# Patient Record
Sex: MALE | Race: WHITE | NOT HISPANIC OR LATINO | Employment: FULL TIME | URBAN - METROPOLITAN AREA
[De-identification: names, ages, dates, MRNs, and addresses within clinical notes are randomized per-mention and may not be internally consistent; named-entity substitution may affect disease eponyms.]

---

## 2017-03-21 ENCOUNTER — GENERIC CONVERSION - ENCOUNTER (OUTPATIENT)
Dept: OTHER | Facility: OTHER | Age: 27
End: 2017-03-21

## 2017-03-21 ENCOUNTER — ALLSCRIPTS OFFICE VISIT (OUTPATIENT)
Dept: OTHER | Facility: OTHER | Age: 27
End: 2017-03-21

## 2017-03-22 ENCOUNTER — GENERIC CONVERSION - ENCOUNTER (OUTPATIENT)
Dept: OTHER | Facility: OTHER | Age: 27
End: 2017-03-22

## 2017-03-22 LAB
A/G RATIO (HISTORICAL): 1.8 (ref 1.2–2.2)
ALBUMIN SERPL BCP-MCNC: 4.8 G/DL (ref 3.5–5.5)
ALP SERPL-CCNC: 91 IU/L (ref 39–117)
ALT SERPL W P-5'-P-CCNC: 71 IU/L (ref 0–44)
AST SERPL W P-5'-P-CCNC: 169 IU/L (ref 0–40)
BASOPHILS # BLD AUTO: 0.1 X10E3/UL (ref 0–0.2)
BASOPHILS # BLD AUTO: 1 %
BILIRUB SERPL-MCNC: 0.6 MG/DL (ref 0–1.2)
BUN SERPL-MCNC: 14 MG/DL (ref 6–20)
BUN/CREA RATIO (HISTORICAL): 12 (ref 8–19)
CALCIUM SERPL-MCNC: 9.8 MG/DL (ref 8.7–10.2)
CHLORIDE SERPL-SCNC: 98 MMOL/L (ref 96–106)
CHOLEST SERPL-MCNC: 213 MG/DL (ref 100–199)
CO2 SERPL-SCNC: 25 MMOL/L (ref 18–29)
CREAT SERPL-MCNC: 1.2 MG/DL (ref 0.76–1.27)
DEPRECATED RDW RBC AUTO: 13.9 % (ref 12.3–15.4)
EGFR AFRICAN AMERICAN (HISTORICAL): 96 ML/MIN/1.73
EGFR-AMERICAN CALC (HISTORICAL): 83 ML/MIN/1.73
EOSINOPHIL # BLD AUTO: 0.2 X10E3/UL (ref 0–0.4)
EOSINOPHIL # BLD AUTO: 3 %
GLUCOSE SERPL-MCNC: 100 MG/DL (ref 65–99)
HCT VFR BLD AUTO: 44.6 % (ref 37.5–51)
HDLC SERPL-MCNC: 41 MG/DL
HGB BLD-MCNC: 15.1 G/DL (ref 12.6–17.7)
IMM.GRANULOCYTES (CD4/8) (HISTORICAL): 0 %
IMM.GRANULOCYTES (CD4/8) (HISTORICAL): 0 X10E3/UL (ref 0–0.1)
LDLC SERPL CALC-MCNC: 143 MG/DL (ref 0–99)
LYMPHOCYTES # BLD AUTO: 2.1 X10E3/UL (ref 0.7–3.1)
LYMPHOCYTES # BLD AUTO: 29 %
MCH RBC QN AUTO: 27.5 PG (ref 26.6–33)
MCHC RBC AUTO-ENTMCNC: 33.9 G/DL (ref 31.5–35.7)
MCV RBC AUTO: 81 FL (ref 79–97)
MONOCYTES # BLD AUTO: 0.5 X10E3/UL (ref 0.1–0.9)
MONOCYTES (HISTORICAL): 7 %
NEUTROPHILS # BLD AUTO: 4.3 X10E3/UL (ref 1.4–7)
NEUTROPHILS # BLD AUTO: 60 %
PLATELET # BLD AUTO: 310 X10E3/UL (ref 150–379)
POTASSIUM SERPL-SCNC: 4.6 MMOL/L (ref 3.5–5.2)
RBC (HISTORICAL): 5.5 X10E6/UL (ref 4.14–5.8)
SODIUM SERPL-SCNC: 141 MMOL/L (ref 134–144)
TOT. GLOBULIN, SERUM (HISTORICAL): 2.6 G/DL (ref 1.5–4.5)
TOTAL PROTEIN (HISTORICAL): 7.4 G/DL (ref 6–8.5)
TRIGL SERPL-MCNC: 147 MG/DL (ref 0–149)
WBC # BLD AUTO: 7.1 X10E3/UL (ref 3.4–10.8)

## 2017-03-23 ENCOUNTER — GENERIC CONVERSION - ENCOUNTER (OUTPATIENT)
Dept: OTHER | Facility: OTHER | Age: 27
End: 2017-03-23

## 2017-03-23 LAB
HEPATITIS A IGM ANTIBODY (HISTORICAL): NEGATIVE
HEPATITIS B CORE IGM ANTIBODY (HISTORICAL): NEGATIVE
HEPATITIS B SURFACE ANTIGEN (HISTORICAL): NEGATIVE
HEPATITIS C ANTIBODY (HISTORICAL): <0.1 S/CO RATIO (ref 0–0.9)
WRITTEN AUTHORIZATION (HISTORICAL): NORMAL

## 2017-11-10 ENCOUNTER — ALLSCRIPTS OFFICE VISIT (OUTPATIENT)
Dept: OTHER | Facility: OTHER | Age: 27
End: 2017-11-10

## 2017-11-11 LAB
ALBUMIN SERPL BCP-MCNC: 4.8 G/DL (ref 3.5–5.5)
ALP SERPL-CCNC: 85 IU/L (ref 39–117)
ALT SERPL W P-5'-P-CCNC: 39 IU/L (ref 0–44)
AST SERPL W P-5'-P-CCNC: 30 IU/L (ref 0–40)
BILIRUB SERPL-MCNC: 0.5 MG/DL (ref 0–1.2)
BILIRUBIN DIRECT (HISTORICAL): 0.12 MG/DL (ref 0–0.4)
CHOLEST SERPL-MCNC: 207 MG/DL (ref 100–199)
HBA1C MFR BLD HPLC: 5.4 % (ref 4.8–5.6)
HDLC SERPL-MCNC: 36 MG/DL
LDLC SERPL CALC-MCNC: 143 MG/DL (ref 0–99)
TOTAL PROTEIN (HISTORICAL): 7.4 G/DL (ref 6–8.5)
TRIGL SERPL-MCNC: 142 MG/DL (ref 0–149)

## 2017-11-13 NOTE — PROGRESS NOTES
Assessment    1  Dyspnea on exertion (786 09) (R06 09)   2  Hyperlipidemia, unspecified (272 4) (E78 5)   3  Elevated fasting blood sugar (790 21) (R73 01)   4  Elevated liver function tests (790 6) (R79 89)   5  Never a smoker   6  Family history of valvular heart disease (V17 49) (Z82 49) : Family History    Plan  Dyspnea on exertion    · SPIROMETRY W/O BRONCHO- POC; Status:Active - Perform Order; Requestedfor:2017;   Dyspnea on exertion, FamHx: Family history of valvular heart disease, Hyperlipidemia,unspecified    · ECHO COMPLETE WITH CONTRAST IF INDICATED; Status:Need Information -Financial Authorization; Requested for:2017;   Elevated fasting blood sugar    · (1) HEMOGLOBIN A1C; Status:Resulted - Requires Verification;   Done: 60AZF037348:02IU  Elevated liver function tests    · (1) HEPATIC FUNCTION PANEL; Status:Resulted - Requires Verification;   Done:2017 12:00AM  Hyperlipidemia, unspecified    · (1) LIPID PANEL FASTING W DIRECT LDL REFLEX; Status:Resulted - RequiresVerification;   Done: 56IYF3933 12:00AM  PMH: History of influenza vaccination    · Stop: Fluzone Quadrivalent Intramuscular Suspension    Discussion/Summary    1)fasting blood work LIPID PANEL, LIVER FUNCTION AND HBA1C FOR ELEVATED SUGAR  - [facility]ECHO- [facility] spirometryrecheck after tests complete  Possible side effects of new medications were reviewed with the patient/guardian today  The treatment plan was reviewed with the patient/guardian  The patient/guardian understands and agrees with the treatment plan      Chief Complaint  PATIENT IS HERE FOR SOB, AND INCREASED HEART RATE WITH EXERTION TIMES TWO YEARS  HE HAS NOT USED HIS INHALERS TIMES ONE MONTH  HE IS NOT SURE IF THEY HELP  History of Present Illness  HPI: COMPLAINS OF SHORTNESS OF BREATH with physical exertion,FEW NIGHTS A WEEK , sexual activity  CARDIAC HISTORY: PATERNAL SIDE UNKNOWN,  of LEIOMYOSARCOMA 53MATERNAL side   inhaler did not help significantly      Review of Systems   Constitutional: no fever or chills, feels well, no tiredness, no recent weight loss or weight gain  ENT: no complaints of earache, no loss of hearing, no nosebleeds or nasal discharge, no sore throat or hoarseness  Cardiovascular: palpitations, but-- no complaints of slow or fast heart rate, no chest pain, no palpitations, no leg claudication or lower extremity edema,-- as noted in HPI,-- no chest pain,-- no intermittent leg claudication-- and-- no lower extremity edema  Respiratory: shortness of breath-- and-- shortness of breath during exertion, but-- as noted in HPI,-- no cough,-- no orthopnea,-- no wheezing-- and-- no PND  Gastrointestinal: no complaints of abdominal pain, no constipation, no nausea or vomiting, no diarrhea or bloody stools  Genitourinary: no complaints of dysuria or incontinence, no hesitancy, no nocturia, no genital lesion, no inadequacy of penile erection  Musculoskeletal: no complaints of arthralgia, no myalgia, no joint swelling or stiffness, no limb pain or swelling  Integumentary: no complaints of skin rash or lesion, no itching or dry skin, no skin wounds  Neurological: no complaints of headache, no confusion, no numbness or tingling, no dizziness or fainting  Active Problems    1  Elevated liver function tests (790 6) (R79 89)   2  Exercise-induced asthma (493 81) (J45 990)   3  Hyperlipidemia, unspecified (272 4) (E78 5)   4  Mild persistent asthma without complication (142 19) (V56 35)   5  Screening for diabetes mellitus (DM) (V77 1) (Z13 1)   6  Screening for endocrine, metabolic and immunity disorder (V77 99) (Z13 29,Z13 0,Z13 228)   7  Screening for lipoid disorders (V77 91) (Z13 220)    Past Medical History  1  History of Acute tonsillitis (463) (J03 90)   2  History of Cystitis without hematuria (595 9) (N30 90)   3  History of Exposure to communicable disease (V01 9) (Z20 9)   4   History of shortness of breath (V13 89) (E28 403)   5  History of Pain in joint of left wrist (719 43) (G81 209)  Active Problems And Past Medical History Reviewed: The active problems and past medical history were reviewed and updated today  Family History  Mother    1  Family history of Asthma (V17 5)  Maternal Grandmother    2  Family history of Arthritis (V17 7)   3  Family history of Asthma (V17 5)   4  Family history of Diabetes Mellitus (V18 0)  Family History    5  Family history of Cancer   6  Family history of valvular heart disease (V17 49) (Z82 49)  Family History Reviewed: The family history was reviewed and updated today  Social History   · Being A Social Drinker   · Exercising Regularly   · Never a smoker  The social history was reviewed and updated today  The social history was reviewed and is unchanged  Surgical History    1  Denied: History Of Prior Surgery  Surgical History Reviewed: The surgical history was reviewed and updated today  Current Meds   1  Multi-Vitamin TABS; Therapy: (Recorded:16Apr2013) to Recorded   2  ProAir  (90 Base) MCG/ACT Inhalation Aerosol Solution; Therapy: (Recorded:21Mar2017) to Recorded   3  Symbicort 80-4 5 MCG/ACT Inhalation Aerosol; INHALE 2 PUFFS Twice daily; Therapy: 90CHH7824 to (Last Rx:21Mar2017) Ordered    The medication list was reviewed and updated today  Allergies  1  No Known Drug Allergies    Vitals   Recorded: 27IIZ1807 08:59AM   Temperature 97 6 F   Heart Rate 66   Systolic 353   Diastolic 80   Height 6 ft 2 in   Weight 255 lb 8 oz   BMI Calculated 32 8   BSA Calculated 2 41   O2 Saturation 98       Physical Exam   Constitutional  General appearance: No acute distress, well appearing and well nourished  Eyes  Conjunctiva and lids: No swelling, erythema, or discharge  Pupils and irises: Equal, round and reactive to light     Ears, Nose, Mouth, and Throat  External inspection of ears and nose: Normal    Otoscopic examination: Tympanic membrance translucent with normal light reflex  Canals patent without erythema  Nasal mucosa, septum, and turbinates: Normal without edema or erythema  Oropharynx: Normal with no erythema, edema, exudate or lesions  Pulmonary  Respiratory effort: No increased work of breathing or signs of respiratory distress  Auscultation of lungs: Clear to auscultation, equal breath sounds bilaterally, no wheezes, no rales, no rhonci  Cardiovascular  Auscultation of heart: Normal rate and rhythm, normal S1 and S2, without murmurs  Examination of extremities for edema and/or varicosities: Normal    Abdomen  Abdomen: Non-tender, no masses  Liver and spleen: No hepatomegaly or splenomegaly     Psychiatric  Orientation to person, place and time: Normal    Mood and affect: Normal          Results/Data  (1) LIPID PANEL FASTING W DIRECT LDL REFLEX [id number] 12:00AM Jai Jamaal     Test Name Result Flag Reference   Cholesterol, Total 207 mg/dL H 100-199   Triglycerides 142 mg/dL  0-149   HDL Cholesterol 36 mg/dL L >39   LDL Cholesterol Calc 143 mg/dL H 0-99     (1) HEMOGLOBIN A1C 82YST1480 12:00AM Jai Jamaal     Test Name Result Flag Reference   Hemoglobin A1c 5 4 %  4 8-5 6     Pre-diabetes: 5 7 - 6 4          Diabetes: >6 4          Glycemic control for adults with diabetes: <7 0     (1) HEPATIC FUNCTION PANEL 02PMZ1709 12:00AM Jai Jamaal     Test Name Result Flag Reference   Protein, Total, Serum 7 4 g/dL  6 0-8 5   Albumin, Serum 4 8 g/dL  3 5-5 5   Bilirubin, Total 0 5 mg/dL  0 0-1 2   Bilirubin, Direct 0 12 mg/dL  0 00-0 40   Alkaline Phosphatase, S 85 IU/L     AST (SGOT) 30 IU/L  0-40   ALT (SGPT) 39 IU/L  0-44         Signatures   Electronically signed by : Shala Gomez DO; Nov 12 2017 11:24PM EST                       (Author)

## 2017-11-16 ENCOUNTER — TRANSCRIBE ORDERS (OUTPATIENT)
Dept: ADMINISTRATIVE | Facility: HOSPITAL | Age: 27
End: 2017-11-16

## 2017-11-16 DIAGNOSIS — E78.5 HYPERLIPIDEMIA, UNSPECIFIED HYPERLIPIDEMIA TYPE: ICD-10-CM

## 2017-11-16 DIAGNOSIS — R06.89 HYPOVENTILATION, IDIOPATHIC: Primary | ICD-10-CM

## 2017-11-16 DIAGNOSIS — Z82.49 FAMILY HISTORY OF ISCHEMIC HEART DISEASE: ICD-10-CM

## 2017-11-28 ENCOUNTER — HOSPITAL ENCOUNTER (OUTPATIENT)
Dept: PULMONOLOGY | Facility: HOSPITAL | Age: 27
Discharge: HOME | End: 2017-11-28
Payer: COMMERCIAL

## 2017-11-28 ENCOUNTER — GENERIC CONVERSION - ENCOUNTER (OUTPATIENT)
Dept: FAMILY MEDICINE CLINIC | Facility: CLINIC | Age: 27
End: 2017-11-28

## 2017-11-28 ENCOUNTER — HOSPITAL ENCOUNTER (OUTPATIENT)
Dept: NON INVASIVE DIAGNOSTICS | Facility: HOSPITAL | Age: 27
End: 2017-11-28
Payer: COMMERCIAL

## 2017-11-28 DIAGNOSIS — R06.89 HYPOVENTILATION, IDIOPATHIC: ICD-10-CM

## 2017-11-28 PROCEDURE — 94060 EVALUATION OF WHEEZING: CPT

## 2017-11-28 PROCEDURE — 94760 N-INVAS EAR/PLS OXIMETRY 1: CPT

## 2017-11-28 RX ORDER — ALBUTEROL SULFATE 2.5 MG/3ML
2.5 SOLUTION RESPIRATORY (INHALATION) EVERY 6 HOURS PRN
Status: DISCONTINUED | OUTPATIENT
Start: 2017-11-28 | End: 2017-12-02 | Stop reason: HOSPADM

## 2017-11-30 ENCOUNTER — HOSPITAL ENCOUNTER (OUTPATIENT)
Dept: NON INVASIVE DIAGNOSTICS | Facility: HOSPITAL | Age: 27
Discharge: HOME | End: 2017-11-30
Payer: COMMERCIAL

## 2017-11-30 DIAGNOSIS — Z82.49 FAMILY HISTORY OF ISCHEMIC HEART DISEASE: ICD-10-CM

## 2017-11-30 DIAGNOSIS — R06.89 HYPOVENTILATION, IDIOPATHIC: ICD-10-CM

## 2017-11-30 DIAGNOSIS — E78.5 HYPERLIPIDEMIA, UNSPECIFIED HYPERLIPIDEMIA TYPE: ICD-10-CM

## 2017-11-30 PROCEDURE — 93306 TTE W/DOPPLER COMPLETE: CPT

## 2017-12-07 ENCOUNTER — GENERIC CONVERSION - ENCOUNTER (OUTPATIENT)
Dept: OTHER | Facility: OTHER | Age: 27
End: 2017-12-07

## 2017-12-07 DIAGNOSIS — R06.09 OTHER FORMS OF DYSPNEA: ICD-10-CM

## 2017-12-21 ENCOUNTER — HOSPITAL ENCOUNTER (OUTPATIENT)
Dept: RADIOLOGY | Facility: HOSPITAL | Age: 27
Discharge: HOME | End: 2017-12-21
Payer: COMMERCIAL

## 2017-12-21 ENCOUNTER — TRANSCRIBE ORDERS (OUTPATIENT)
Dept: ADMINISTRATIVE | Facility: HOSPITAL | Age: 27
End: 2017-12-21

## 2017-12-21 DIAGNOSIS — R06.09 OTHER FORMS OF DYSPNEA: ICD-10-CM

## 2017-12-21 PROCEDURE — 71020 HB CHEST X-RAY 2VW FRONTAL&LATL: CPT

## 2017-12-22 ENCOUNTER — GENERIC CONVERSION - ENCOUNTER (OUTPATIENT)
Dept: OTHER | Facility: OTHER | Age: 27
End: 2017-12-22

## 2018-01-05 ENCOUNTER — GENERIC CONVERSION - ENCOUNTER (OUTPATIENT)
Dept: FAMILY MEDICINE CLINIC | Facility: CLINIC | Age: 28
End: 2018-01-05

## 2018-01-09 ENCOUNTER — ALLSCRIPTS OFFICE VISIT (OUTPATIENT)
Dept: OTHER | Facility: OTHER | Age: 28
End: 2018-01-09

## 2018-01-10 NOTE — CONSULTS
Assessment   1  Dyspnea on exertion (786 09) (R06 09)   2  Chronic coughing (786 2) (R05)   3  Pectus excavatum (754 81) (Q67 6)    Plan   Chronic coughing    · Omeprazole 40 MG Oral Capsule Delayed Release; TAKE 1 CAPSULE DAILY    30minutes prior to dinner   Rx By: Clayton Chavarria; Dispense: 60 Days ; #:60 Capsule Delayed Release; Refill: 1;For: Chronic coughing; DEVIN = N; Verified Transmission to [id number] Last Updated By: System, [employer]; 1/9/2018 11:54:16 AM   · Follow-up visit in 2 months Evaluation and Treatment  Follow-up  Status: Hold For -    Scheduling  Requested for: [id number]   Ordered; For: Chronic coughing; Ordered By: Clayton Chavarria Performed:  Due: 64LDP8249  Dyspnea on exertion    · Six Minute Walk Test - POC; Status:Complete;   Done: 82RWK7810   Perform: In Office; RLN:[id number]; Ordered; For:Dyspnea on exertion; Ordered By:Jerzy Boland;   · SPIROMETRY W/O BRONCHO- POC; Status:Active - Perform Order; Requested    JCR:[id number]; Perform: In Office; IYE:[id number]; Ordered; Today; For:Dyspnea on exertion; Ordered By:Krystal Boland; Results/Data   Results Free Text Form [facility]:    Results      Chest X-ray CXR 12/21/17 - images personally reviewed - no focal infiltrates, clear CP angles, no PTX, normal cardiomediastinal silhouette  CT Scan CT scan 2008 - images personally reviewed - mild pectus deformity, no parenchymal infiltrates or ILD changes  Cardiac Testing TTE 11/30/17 - EF 65%, normal RV, trace MR, trace AR      PFT Results v2:      Spirometry:    Post Bronchodilator Spirometry:    Lung Volumes:    DLCO:       PFT Interpretation:      11/28/17 - Spirometry - RAtio 78%, FVC 5 68L (94%), FEV1 4 41L (89%) - post BD FEV1 inc 8% - normal spirometry, not clearly improved with BD   = Ratio 82%, FVC 5 80L (99%), FEV1 4 74L (99%) - normal spirometry - 6MWT on room air - total walk distance 558 meters, initial SpO2 98%, erickson SpO2 97%, conclusion SpO2 98%, initial HR 87bpm, maximal HR 116bpm  (1) CBC/PLT/DIFF 94WMX2823 12:00AM Tim Callahan      Test Name Result Flag Reference   WBC 7 1 x10E3/uL  3 4-10 8   RBC 5 50 x10E6/uL  4 14-5 80   Hemoglobin 15 1 g/dL  12 6-17 7   Hematocrit 44 6 %  37 5-51 0   MCV 81 fL  79-97   MCH 27 5 pg  26 6-33 0   MCHC 33 9 g/dL  31 5-35 7   RDW 13 9 %  12 3-15 4   Platelets 833 Q62Z6/VQ  150-379   Neutrophils 60 %     Lymphs 29 %     Monocytes 7 %     Eos 3 %     Basos 1 %     Neutrophils (Absolute) 4 3 x10E3/uL  1 4-7 0   Lymphs (Absolute) 2 1 x10E3/uL  0 7-3 1   Monocytes(Absolute) 0 5 x10E3/uL  0 1-0 9   Eos (Absolute) 0 2 x10E3/uL  0 0-0 4   Baso (Absolute) 0 1 x10E3/uL  0 0-0 2   Immature Granulocytes 0 %     Immature Grans (Abs) 0 0 x10E3/uL  0 0-0 1      Discussion/Summary   Discussion Summary: It is a pleasure to meet Mr Shani Flores today  Many of his symptoms are concerning for possible asthma, however he has not demonstrated significant improvement in ICS/LAB or OCS therapy given by his allergist  His spirometry has remained normal, his 6MWT and TTE are without any signs of potential occult pulmonary hypertension, and he is without other significant systemic symptoms  I am suspicious for possible subclinical reflux as the etiology of his symptoms and this may be etiology of his nocturnal dyspnea and exertional dyspnea with high intensity exercise  I have recommended he continue with symbicort and as needed proAir per his allergist recommendations and obtain allergy evaluation as previously planned  I have recommended trial of omeprazole 40mg daily now for possible subclinical GERD  Based upon his allergist evaluation and response to PPI further testing to be determined  Should symptoms persist - I would consider complete PFTs, HRCT chest, and serology/HP evaluation with possible CPEX testing  Currently I do not suspect his mild pectus deformity is inducing his exertional dyspnea  He was in agreement with this plan   Allergist records requested  Flu vaccine encouraged but he declined administration today  Goals and Barriers: The patient has the current Goals: Improve cough and dyspnea  The patent has the current Barriers: None  Patient's Capacity to Self-Care: Patient is able to Self-Care  Medication SE Review and Pt Understands Tx: Possible side effects of new medications were reviewed with the patient/guardian today  The treatment plan was reviewed with the patient/guardian  The patient/guardian understands and agrees with the treatment plan      Active Problems    · Dyspnea on exertion (786 09) (R06 09)   · Elevated fasting blood sugar (790 21) (R73 01)   · Elevated liver function tests (790 6) (R79 89)   · Exercise-induced asthma (493 81) (J45 990)   · Hyperlipidemia, unspecified (272 4) (E78 5)   · Mild persistent asthma without complication (931 72) (C49 15)   · Screening for diabetes mellitus (DM) (V77 1) (Z13 1)   · Screening for endocrine, metabolic and immunity disorder (V77 99)    (Z13 29,Z13 0,Z13 228)   · Screening for lipoid disorders (V77 91) (T17 112)    Chief Complaint   Chief Complaint Free Text Note Form: dyspnea, asthma evaluation      History of Present Illness   HPI: Mr Shavon Oh is a 32year old male who presents for dyspnea and asthma evaluation by his PCP  He reports longstanding history of shortness of breath and wheeze with exertional activities  He reported possible history of exercise induced asthma  He denied history of childhood asthma or frequent respiratory infections  He notes having pneumonia in 2014 and has also noted episodes of bronchitis 1-2 times per year  He has noted decline in exertional status over the past few years and 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  He notes chronic cough with clear sputum production  He will have nocturnal dyspnea 3-4 times per month   He denied hemoptysis, no purulent sputum production, no dysphagia, no odynophagia, no aspiration events, no reflux symptoms  He does report raspy voice at later in the day  He reported chronic nasal congestion and allergies  He denied pleurisy but would report fast HR with conditioning for boxing, denied syncopal events, no LH  He reports he feels he is not making progress in his conditioning  He denied LE edema, no rashes, no adenopathy, no vision changes, no dactylitis or arthralgia symptoms  He denied any change in home environment  He reported no change with prior inhaler from PCP  was seen by allergist last week and started on prednisone 40mg burst (5 days last day today), symbicort and as needed ProAir which he is using twice daily  He does not report any significant improvement with this regimen  He has continued cough  He has upcoming appointment later this month for allergy testing  Social History lifelong nonsmoker no illicit drug use social ETOH use No pets or exotic animal exposure 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 [employer]  Review of Systems   Complete-Male Pulm:      Constitutional: no fever,-- not feeling poorly,-- no recent weight gain,-- no chills,-- not feeling tired-- and-- no recent weight loss  Eyes: no eye pain-- and-- eyes not red  ENT: nasal discharge-- and-- hoarseness, but-- no nosebleeds-- and-- no sore throat  Cardiovascular: palpitations, but-- no chest pain-- and-- no extremity edema  Respiratory: as noted in HPI  Gastrointestinal: no abdominal pain,-- no nausea-- and-- no vomiting  Genitourinary: no nocturia  Musculoskeletal: no arthralgias,-- no joint swelling-- and-- no myalgias  Integumentary: no rashes  Neurological: no headache,-- no dizziness,-- no fainting-- and-- no difficulty walking  Psychiatric: sleep disturbances, but-- as noted in HPI  Endocrine: no hot flashes  Hematologic/Lymphatic: no swollen glands-- and-- no swollen glands in the neck        Past Medical History   1  History of Acute tonsillitis (463) (J03 90)   2  History of Cystitis without hematuria (595 9) (N30 90)   3  History of Exposure to communicable disease (V01 9) (Z20 9)   4  History of shortness of breath (V13 89) (Z87 898)   5  History of Pain in joint of left wrist (719 43) (E39 915)  Active Problems And Past Medical History Reviewed: The active problems and past medical history were reviewed and updated today  Surgical History   1  History of Oral Surgery Tooth Extraction [place] Tooth  Surgical History Reviewed: The surgical history was reviewed and updated today  Family History   Mother    1  Family history of Asthma (V17 5)  Maternal Grandmother    2  Family history of Arthritis (V17 7)   3  Family history of Asthma (V17 5)   4  Family history of Diabetes Mellitus (V18 0)  Family History    5  Family history of Cancer   6  Family history of valvular heart disease (V17 49) (Z82 49)  Family History Reviewed: The family history was reviewed and updated today  Social History    · Being A Social Drinker   · Exercising Regularly   · Never a smoker  Social History Reviewed: The social history was reviewed and updated today  The social history was reviewed and is unchanged  Current Meds    1  Multi-Vitamin TABS; Therapy: (Recorded:16Apr2013) to Recorded   2  ProAir  (90 Base) MCG/ACT Inhalation Aerosol Solution; Therapy: (Recorded:21Mar2017) to Recorded   3  Symbicort 80-4 5 MCG/ACT Inhalation Aerosol; INHALE 2 PUFFS Twice daily; Therapy: 43JQE3668 to (Last Rx:21Mar2017) Ordered  Medication List Reviewed: The medication list was reviewed and updated today  Allergies   1   No Known Drug Allergies    Vitals   Vital Signs    Recorded: 10IJF5163 11:07AM   Temperature 97 2 F   Heart Rate 78   Respiration 18   Systolic 450   Diastolic 84   Height 6 ft 2 in   Weight 265 lb    BMI Calculated 34 02   BSA Calculated 2 45   O2 Saturation 98, RA     Physical Exam        Constitutional      General appearance: No acute distress, well appearing and well nourished  -- WDWN male, conversant w/o distress  Eyes      Examination of pupil and irises: Anicteric, pupils reactive  Ears, Nose, Mouth, and Throat      Nasal mucosa, septum, and turbinates: Normal without edema or erythema  Lips, teeth, and gums: Normal, good dentition  Oropharynx: Abnormal  -- mild posterior pharyngeal cobblestoning, no exudates  Neck      Neck: Supple, symmetric, trachea midline, no masses  Jugular veins: Normal        Pulmonary      Chest: Abnormal  -- mild pectus deformity  Respiratory effort: Abnormal  -- intermittent cough  Auscultation of lungs: Clear to auscultation, no rales, no crackles, no wheezing  -- good air movement bilaterally, no wheeze or rales  Cardiovascular      Auscultation of heart: Normal rate and rhythm, normal S1 and S2, no murmurs  Examination of extremities for edema and/or varicosities: Normal        Abdomen      Abdomen: Soft, non-tender  Lymphatic      Palpation of lymph nodes in neck: No lymphadenopathy  Musculoskeletal      Digits and nails: Normal without clubbing or cyanosis  Neurologic      Mental Status: Normal  Not confused, no evidence of dementia, good comprehension, good concentration  Skin      Skin and subcutaneous tissue: Limited exam shows no rash  Palpation of skin and subcutaneous tissue: Normal turgor         Psychiatric      Orientation to person, place and time: Normal        Mood and affect: Normal        Future Appointments      Date/Time Provider Specialty Site   04/02/2018 03:40 PM Nicolasa Gallegos DO Pulmonary Medicine [address] [facility] [place]    Electronically signed by : Rakel Lindquist DO; Jan 9 2018 12:08PM EST                       (Author)

## 2018-01-11 NOTE — RESULT NOTES
Verified Results  (1) HEMOGLOBIN A1C 39HYY8602 12:00AM Dorthea Batch     Test Name Result Flag Reference   Hemoglobin A1c 5 4 %  4 8-5 6   Pre-diabetes: 5 7 - 6 4           Diabetes: >6 4           Glycemic control for adults with diabetes: <7 0       Plan  Dyspnea on exertion    · SPIROMETRY W/O BRONCHO- POC; Status:Active - Perform Order; Requested  for:10Nov2017;   Dyspnea on exertion, FamHx: Family history of valvular heart disease, Hyperlipidemia,  unspecified    · ECHO COMPLETE WITH CONTRAST IF INDICATED; Status:Need Information -  Financial Authorization;  Requested for:10Nov2017;   Elevated fasting blood sugar    · (1) HEMOGLOBIN A1C; Status:Complete;   Done: 06IZH1804 12:00AM   · (1) HEMOGLOBIN A1C; Status:Complete;   Done: 62SGW4098 12:00AM  Elevated liver function tests    · (1) HEPATIC FUNCTION PANEL; Status:Complete;   Done: 36HUV1485 12:00AM  Hyperlipidemia, unspecified    · (1) LIPID PANEL FASTING W DIRECT LDL REFLEX; Status:Complete;   Done:  10NIS8312 12:00AM  PMH: History of influenza vaccination    · Stop: Fluzone Quadrivalent Intramuscular Suspension

## 2018-01-12 VITALS
HEART RATE: 66 BPM | BODY MASS INDEX: 32.92 KG/M2 | HEIGHT: 72 IN | WEIGHT: 243.06 LBS | OXYGEN SATURATION: 98 % | SYSTOLIC BLOOD PRESSURE: 124 MMHG | DIASTOLIC BLOOD PRESSURE: 80 MMHG | TEMPERATURE: 98 F

## 2018-01-12 NOTE — PROGRESS NOTES
Assessment    1  Encounter for preventive health examination (V70 0) (Z00 00)   2  Mild persistent asthma without complication (894 03) (H53 46)   3  Never a smoker   4  Screening for lipoid disorders (V77 91) (Z13 220)   5  Screening for diabetes mellitus (DM) (V77 1) (Z13 1)   6  Screening for endocrine, metabolic and immunity disorder (V77 99)   (Z13 29,Z13 0,Z13 228)    Plan  Health Maintenance    · Brush your teeth 3 times a day and floss at least once a day ; Status:Complete;   Done:  68DMJ8644   · We recommend routine visits to a dentist ; Status:Complete;   Done: 61MJY2186   · We recommend that you follow the "Mediterranean diet "; Status:Complete;   Done:  61YVT6032   · We recommend you modify your diet to achieve and maintain a healthy weight  Being  underweight may increase your risk of developing health problems from vitamin and  mineral deficiencies  We recommend a balanced diet rich in fruits and vegetables  You  may also consider increasing your calorie intake by eating more frequently or adding  nuts, avocados, and low-fat cheese or milk to your meals  Please let us know  if you would like to learn more about your nutrition and calorie needs, and additional  options to help you achieve your weight goals ; Status:Complete;   Done: 35RPO4747   · Call [phone] if: You have any warning signs of skin cancer ; Status:Complete;    Done: 43WDT6522  Mild persistent asthma without complication    · Symbicort 80-4 5 MCG/ACT Inhalation Aerosol; INHALE 2 PUFFS Twice daily  Mild persistent asthma without complication, Screening for endocrine, metabolic and  immunity disorder    · (1) CBC/PLT/DIFF; Status:Active;  Requested GCC:22TSD8982;   Screening for diabetes mellitus (DM), Screening for endocrine, metabolic and immunity  disorder, Screening for lipoid disorders    · Routine Venipuncture - POC; Status:Active - Perform Order; Requested SJL:95LVX6090;   Screening for lipoid disorders    · (1) COMPREHENSIVE METABOLIC PANEL; Status:Active; Requested for:21Mar2017;    · (1) LIPID PANEL FASTING W DIRECT LDL REFLEX; Status:Active; Requested  for:21Mar2017;     Discussion/Summary  Impression: health maintenance visit  Currently, he eats a healthy diet and has an adequate exercise regimen  Prostate cancer screening: PSA is not indicated  Testicular cancer screening: the risks and benefits of testicular cancer screening were discussed  Colorectal cancer screening: colorectal cancer screening is not indicated  The immunizations are up to date  Patient discussion: discussed with the patient  1) use proair 2 puffs 15 min prior to activity  2) maintenance inhaler symbicort 80 2 puffs twice a day -rinse mouth  3) recheck 1 month  4) fasting blood work done today       Chief Complaint  PATIENT IS HERE FOR ANNUAL PHYSICAL  History of Present Illness  HM, Adult Male: The patient is being seen for a health maintenance evaluation  The last health maintenance visit was 4 year(s) ago  General Health: The patient's health since the last visit is described as good  He has regular dental visits  He denies vision problems  He denies hearing loss  Immunizations status: up to date  Lifestyle:  He consumes a diverse and healthy diet  He does not have any weight concerns  He exercises regularly  He does not use tobacco  He consumes alcohol  He denies drug use  Screening: cancer screening reviewed and updated  metabolic screening reviewed and updated  risk screening reviewed and updated  HPI: pt here for a well exam today  pt boxes 2 times a week and weight lifts once or twice a week  has been eating fruits and veggies  bms regular  denies recent illnesses  drinks water  complains of shortness of breath with activities, going up stairs, working out  denies chest pain  asthma- uses inhaler with relief but stops activity due to breathing    had pneumonia a few years ago  noticed symptoms over the past 6 months  Review of Systems    Constitutional: No fever or chills, feels well, no tiredness, no recent weight gain or weight loss  Eyes: No complaints of eye pain, no red eyes, no discharge from eyes, no itchy eyes  ENT: no complaints of earache, no hearing loss, no nosebleeds, no nasal discharge, no sore throat, no hoarseness  Cardiovascular: No complaints of slow heart rate, no fast heart rate, no chest pain, no palpitations, no leg claudication, no lower extremity  Respiratory: shortness of breath during exertion  Gastrointestinal: No complaints of abdominal pain, no constipation, no nausea or vomiting, no diarrhea or bloody stools  Genitourinary: No complaints of dysuria, no incontinence, no hesitancy, no nocturia, no genital lesion, no testicular pain  Musculoskeletal: No complaints of arthralgia, no myalgias, no joint swelling or stiffness, no limb pain or swelling  Integumentary: No complaints of skin rash or skin lesions, no itching, no skin wound, no dry skin  Neurological: No compliants of headache, no confusion, no convulsions, no numbness or tingling, no dizziness or fainting, no limb weakness, no difficulty walking  Psychiatric: Is not suicidal, no sleep disturbances, no anxiety or depression, no change in personality, no emotional problems  Endocrine: No complaints of proptosis, no hot flashes, no muscle weakness, no erectile dysfunction, no deepening of the voice, no feelings of weakness  Hematologic/Lymphatic: No complaints of swollen glands, no swollen glands in the neck, does not bleed easily, no easy bruising  Active Problems    1   Exercise-induced asthma (493 81) (J45 990)    Past Medical History    · History of Acute tonsillitis (463) (J03 90)   · History of Cystitis without hematuria (595 9) (N30 90)   · History of Exposure to communicable disease (V01 9) (Z20 9)   · History of shortness of breath (V13 89) (K07 909)   · History of Pain in joint of left wrist (719 43) (R13 188)    Surgical History    · Denied: History Of Prior Surgery    Family History  Mother    · Family history of Asthma (V17 5)  Maternal Grandmother    · Family history of Arthritis (V17 7)   · Family history of Asthma (V17 5)   · Family history of Diabetes Mellitus (V18 0)  Family History    · Family history of Cancer    Social History    · Being A Social Drinker   · Exercising Regularly   · Never a smoker    Current Meds   1  Multi-Vitamin TABS; Therapy: (Recorded:16Apr2013) to Recorded   2  ProAir  (90 Base) MCG/ACT Inhalation Aerosol Solution; Therapy: (Recorded:21Mar2017) to Recorded   3  Proventil  (90 Base) MCG/ACT Inhalation Aerosol Solution; 2 puffs 15 min prior   to activity up to every 4 hours as needed; Therapy: 81SDU5020 to (Last Rx:08Fcx0036)  Requested for: 03Krj6007 Ordered    Allergies    1  No Known Drug Allergies    Vitals   Recorded: 21Mar2017 11:12AM   Temperature 98 F   Heart Rate 66   Systolic 486   Diastolic 80   Height 6 ft    Weight 243 lb 1 oz   BMI Calculated 32 97   BSA Calculated 2 31   O2 Saturation 98     Physical Exam    Constitutional   General appearance: No acute distress, well appearing and well nourished  Head and Face   Head and face: Normal     Palpation of the face and sinuses: No sinus tenderness  Eyes   Conjunctiva and lids: No erythema, swelling or discharge  Pupils and irises: Equal, round, reactive to light  Ears, Nose, Mouth, and Throat   External inspection of ears and nose: Normal     Otoscopic examination: Tympanic membranes translucent with normal light reflex  Canals patent without erythema  Hearing: Normal     Nasal mucosa, septum, and turbinates: Normal without edema or erythema  Lips, teeth, and gums: Normal, good dentition  Oropharynx: Normal with no erythema, edema, exudate or lesions  Neck   Neck: Supple, symmetric, trachea midline, no masses      Pulmonary   Respiratory effort: No increased work of breathing or signs of respiratory distress  Percussion of chest: Normal     Cardiovascular   Palpation of heart: Normal PMI, no thrills  Peripheral vascular exam: Normal     Examination of extremities for edema and/or varicosities: Normal     Skin   Skin and subcutaneous tissue: Normal without rashes or lesions  Neurologic   Cortical function: Normal mental status  Reflexes: 2+ and symmetric  Coordination: Normal finger to nose and heel to shin  Psychiatric   Judgment and insight: Normal     Orientation to person, place and time: Normal     Recent and remote memory: Intact  Mood and affect: Normal        Results/Data  PHQ-2 Adult Depression Screening 21Mar2017 11:14AM User, Ahs     Test Name Result Flag Reference   PHQ-2 Adult Depression Score 0     Over the last two weeks, how often have you been bothered by any of the following problems? Little interest or pleasure in doing things: Not at all - 0  Feeling down, depressed, or hopeless: Not at all - 0   PHQ-2 Adult Depression Screening Negative         Procedure    Procedure:   Inforrmation supplied by a Snellen chart     Results: 20/20 in both eyes with corrective device, 20/20 in the right eye with corrective device, 20/20 in the left eye with corrective device      Signatures   Electronically signed by : Daphnie Peres DO; Mar 21 2017 10:10PM EST                       (Author)

## 2018-01-14 VITALS
HEIGHT: 74 IN | HEART RATE: 66 BPM | BODY MASS INDEX: 32.79 KG/M2 | WEIGHT: 255.5 LBS | SYSTOLIC BLOOD PRESSURE: 122 MMHG | OXYGEN SATURATION: 98 % | TEMPERATURE: 97.6 F | DIASTOLIC BLOOD PRESSURE: 80 MMHG

## 2018-01-15 NOTE — RESULT NOTES
Verified Results  (1) ACUTE HEPATITIS PANEL 21Mar2017 12:00AM Shruthi Breaux     Test Name Result Flag Reference   Hep A Ab, IgM Negative  Negative   HBsAg Screen Negative  Negative   Hep B Core Ab, IgM Negative  Negative   Hep C Virus Ab <0 1 s/co ratio  0 0-0 9   Negative:     < 0 8                                              Indeterminate: 0 8 - 0 9                                                   Positive:     > 0 9                  The CDC recommends that a positive HCV antibody result                  be followed up with a HCV Nucleic Acid Amplification                  test (872094)  [facility]) Written Authorization [id number] 12:00AM Shruthi Breaux     Test Name Result Flag Reference   Written Authorization Comment     Written Authorization Received    Authorization received from [facility] 03-  Logged by Rosalina Aiken

## 2018-01-15 NOTE — RESULT NOTES
Message   PLEASE ADD ACUTE HEPATITIS PANEL FOR ELEVATED LIVER FUNCTIONS      Verified Results  (1) CBC/PLT/DIFF [id number] 12:00AM Nov[facility]     Test Name Result Flag Reference   WBC 7 1 x10E3/uL  3 4-10 8   RBC 5 50 x10E6/uL  4 14-5 80   Hemoglobin 15 1 g/dL  12 6-17 7   Hematocrit 44 6 %  37 5-51 0   MCV 81 fL  79-97   MCH 27 5 pg  26 6-33 0   MCHC 33 9 g/dL  31 5-35 7   RDW 13 9 %  12 3-15 4   Platelets 251 W51L9/YP  150-379   Neutrophils 60 %     Lymphs 29 %     Monocytes 7 %     Eos 3 %     Basos 1 %     Neutrophils (Absolute) 4 3 x10E3/uL  1 4-7 0   Lymphs (Absolute) 2 1 x10E3/uL  0 7-3 1   Monocytes(Absolute) 0 5 x10E3/uL  0 1-0 9   Eos (Absolute) 0 2 x10E3/uL  0 0-0 4   Baso (Absolute) 0 1 x10E3/uL  0 0-0 2   Immature Granulocytes 0 %     Immature Grans (Abs) 0 0 x10E3/uL  0 0-0 1     (1) LIPID PANEL FASTING W DIRECT LDL REFLEX [id number] 12:00AM [facility]     Test Name Result Flag Reference   LDL Cholesterol Calc 143 mg/dL H 0-99   HDL Cholesterol 41 mg/dL  >39   Triglycerides 147 mg/dL  0-149   Cholesterol, Total 213 mg/dL H 100-199     (1) COMPREHENSIVE METABOLIC PANEL 36CEI1053 02:43WU [facility]     Test Name Result Flag Reference   Glucose, Serum 100 mg/dL H 65-99   BUN 14 mg/dL  6-20   Creatinine, Serum 1 20 mg/dL  0 76-1 27   eGFR If NonAfricn Am 83 mL/min/1 73  >59   eGFR If Africn Am 96 mL/min/1 73  >59   BUN/Creatinine Ratio 12  8-19   Sodium, Serum 141 mmol/L  134-144   Potassium, Serum 4 6 mmol/L  3 5-5 2   Chloride, Serum 98 mmol/L     Carbon Dioxide, Total 25 mmol/L  18-29   Calcium, Serum 9 8 mg/dL  8 7-10 2   Protein, Total, Serum 7 4 g/dL  6 0-8 5   Albumin, Serum 4 8 g/dL  3 5-5 5   Globulin, Total 2 6 g/dL  1 5-4 5   A/G Ratio 1 8  1 2-2 2   **Please note reference interval change**   Bilirubin, Total 0 6 mg/dL  0 0-1 2   Alkaline Phosphatase, S 91 IU/L     AST (SGOT) 169 IU/L H 0-40   ALT (SGPT) 71 IU/L H 0-44       Plan  Elevated liver function tests    · (1) ACUTE HEPATITIS PANEL; Status:Active;  Requested for:22Mar2017;

## 2018-01-22 VITALS
HEIGHT: 74 IN | BODY MASS INDEX: 34.01 KG/M2 | DIASTOLIC BLOOD PRESSURE: 84 MMHG | HEART RATE: 78 BPM | WEIGHT: 265 LBS | TEMPERATURE: 97.2 F | RESPIRATION RATE: 18 BRPM | OXYGEN SATURATION: 98 % | SYSTOLIC BLOOD PRESSURE: 132 MMHG

## 2018-01-23 NOTE — RESULT NOTES
Verified Results  * XR CHEST PA & LATERAL [id number] 10:30AM Avis Cantu    Order Number: [id number]     Test Name Result Flag Reference   XR CHEST PA & LATERAL (Report)     CHEST      INDICATION: Dyspnea     COMPARISON: None     VIEWS: Frontal and lateral projections     IMAGES: 2     FINDINGS:        Heart size is indeterminate due to hypoinflation  Acute consolidation or congestion seen  Soft tissue thickening seen bilaterally in the upper lungs probably due to extrapleural fat     Visualized osseous structures appear within normal limits for the patient's age         IMPRESSION:     No acute consolidation or congestion       Workstation performed: [id number]     Signed by:   Albert Bee MD   12/22/17

## 2018-04-02 ENCOUNTER — OFFICE VISIT (OUTPATIENT)
Dept: PULMONOLOGY | Facility: HOSPITAL | Age: 28
End: 2018-04-02
Payer: COMMERCIAL

## 2018-04-02 VITALS
OXYGEN SATURATION: 97 % | HEIGHT: 73 IN | DIASTOLIC BLOOD PRESSURE: 78 MMHG | TEMPERATURE: 97.3 F | RESPIRATION RATE: 18 BRPM | BODY MASS INDEX: 34.46 KG/M2 | WEIGHT: 260 LBS | HEART RATE: 72 BPM | SYSTOLIC BLOOD PRESSURE: 128 MMHG

## 2018-04-02 DIAGNOSIS — R05.3 CHRONIC COUGHING: Primary | ICD-10-CM

## 2018-04-02 DIAGNOSIS — J45.40 MODERATE PERSISTENT ALLERGIC ASTHMA: ICD-10-CM

## 2018-04-02 PROCEDURE — 99213 OFFICE O/P EST LOW 20 MIN: CPT | Performed by: INTERNAL MEDICINE

## 2018-04-02 RX ORDER — BUDESONIDE AND FORMOTEROL FUMARATE DIHYDRATE 160; 4.5 UG/1; UG/1
AEROSOL RESPIRATORY (INHALATION)
COMMUNITY
Start: 2018-03-28

## 2018-04-02 RX ORDER — OMEPRAZOLE 40 MG/1
CAPSULE, DELAYED RELEASE ORAL
Refills: 3 | COMMUNITY
Start: 2018-03-04

## 2018-04-02 RX ORDER — MULTIVITAMIN
TABLET ORAL
COMMUNITY

## 2018-04-02 NOTE — ASSESSMENT & PLAN NOTE
· Cough is improved  · Will continue with asthma care as listed above  · Will now plan on PPI taper - 40mg every other day x 2 weeks then twice a week x 2 weeks then stop  · Monitor for cough return once off PPI  · Follow up on an as needed basis

## 2018-04-02 NOTE — PROGRESS NOTES
Pulmonary Follow Up Note   Annie Morales 32 y o  male MRN: [id number]  4/2/2018      Assessment:    Moderate persistent allergic asthma  · Clinically improved  · Noted likely allergen triggers  · Attempt to wean Symbicort to 1puff bid in next 2-3 months  · Continue as needed ProAir  · He will follow up on an as needed basis and continues to have care thru [facility] - Dr Jas Villa    Chronic coughing  · Cough is improved  · Will continue with asthma care as listed above  · Will now plan on PPI taper - 40mg every other day x 2 weeks then twice a week x 2 weeks then stop  · Monitor for cough return once off PPI  · Follow up on an as needed basis      Plan:    Diagnoses and all orders for this visit:    Chronic coughing    Moderate persistent allergic asthma    Other orders  -     PROAIR  (90 Base) MCG/ACT inhaler; Inhale 2 puffs every 4 - 6 hours as needed  -     SYMBICORT 160-4 5 MCG/ACT inhaler;   -     Multiple Vitamin (MULTI-VITAMIN DAILY) TABS; Take by mouth  -     omeprazole (PriLOSEC) 40 MG capsule; TAKE 1 CAPSULE DAILY 30MINUTES PRIOR TO DINNER        Return if symptoms worsen or fail to improve  History of Present Illness   HPI:  Annie Morales is a 32 y o  male who presented in January for chronic cough evaluation  He had been diagnosed previously with exercise induced asthma  He had noted worsened dyspnea on exertion and increased cough with nonpurulent sputum production  At last visit, plans made to continue symbicort, prn ProAir and to start trial of PPI  He was also undergoing allergy testing  He presents today for follow up  He reports he is feeling significantly improved  He is unclear if it was the prednisone from her allergist or starting PPI  Allergy records were reviewed and he has numerous (+) testing - oak, coklebur, sorrel, pigweed, ragweed, cockroach  He reported remaining on symbicort 160/4 5 2puffs twice daily    He reported mild increased cough over past 2 weeks after URI but this is improving  He denied any sputum production, no hemoptysis, no rescue inhaler use, no GERD symptoms  He reported no nocturnal dyspnea symptoms  He reports he continues to exercise and box at the gym without difficulty  Review of Systems   Constitutional: Negative for activity change, fatigue, fever and unexpected weight change  HENT: Negative for congestion, rhinorrhea, sinus pain, trouble swallowing and voice change  Eyes: Negative for redness and visual disturbance  Respiratory: Positive for cough  Negative for shortness of breath and wheezing  Cardiovascular: Negative for chest pain, palpitations and leg swelling  Gastrointestinal: Negative for abdominal pain, nausea and vomiting  Musculoskeletal: Negative for arthralgias and myalgias  Skin: Negative for rash  Neurological: Negative for headaches  Hematological: Negative for adenopathy  Psychiatric/Behavioral: Negative for sleep disturbance  The patient is not nervous/anxious  Historical Information   No past medical history on file  No past surgical history on file  No family history on file  Meds/Allergies     Current Outpatient Prescriptions:     Multiple Vitamin (MULTI-VITAMIN DAILY) TABS, Take by mouth, Disp: , Rfl:     omeprazole (PriLOSEC) 40 MG capsule, TAKE 1 CAPSULE DAILY 30MINUTES PRIOR TO DINNER, Disp: , Rfl: 3    PROAIR  (90 Base) MCG/ACT inhaler, Inhale 2 puffs every 4 - 6 hours as needed, Disp: , Rfl: 3    SYMBICORT 160-4 5 MCG/ACT inhaler, , Disp: , Rfl:   No Known Allergies    Vitals: Blood pressure 128/78, pulse 72, temperature (!) 97 3 °F (36 3 °C), temperature source Tympanic, resp  rate 18, height 6' 1" (1 854 m), weight 118 kg (260 lb), SpO2 97 %  Body mass index is 34 3 kg/m²  Oxygen Therapy  SpO2: 97 % (ra)      Physical Exam  Physical Exam   Constitutional: He is oriented to person, place, and time  He appears well-developed and well-nourished     HENT:   Head: Normocephalic and atraumatic  Right Ear: External ear normal    Left Ear: External ear normal    Nose: Nose normal    Mouth/Throat: Oropharynx is clear and moist  No oropharyngeal exudate  Eyes: Conjunctivae are normal  Pupils are equal, round, and reactive to light  Right eye exhibits no discharge  Left eye exhibits no discharge  No scleral icterus  Neck: Neck supple  No JVD present  No tracheal deviation present  Cardiovascular: Normal rate, regular rhythm and normal heart sounds  No murmur heard  Pulmonary/Chest: Effort normal and breath sounds normal  No stridor  No respiratory distress  He has no wheezes  He has no rales  Abdominal: Soft  Bowel sounds are normal  He exhibits no distension  There is no tenderness  Musculoskeletal: He exhibits no edema or deformity  Lymphadenopathy:     He has no cervical adenopathy  Neurological: He is alert and oriented to person, place, and time  Skin: Skin is warm and dry  No rash noted  Psychiatric: He has a normal mood and affect  His behavior is normal        Labs: I have personally reviewed pertinent lab results  Lab Results   Component Value Date    WBC 7 1 03/21/2017    HGB 15 1 03/21/2017    HCT 44 6 03/21/2017    MCV 81 03/21/2017     03/21/2017     Lab Results   Component Value Date    GLUCOSE 100 (H) 03/21/2017    CALCIUM 9 8 03/21/2017     03/21/2017    K 4 6 03/21/2017    CO2 25 03/21/2017    CL 98 03/21/2017    BUN 14 03/21/2017    CREATININE 1 20 03/21/2017     No results found for: IGE  Lab Results   Component Value Date    ALT 39 11/10/2017    AST 30 11/10/2017    ALKPHOS 85 11/10/2017    BILITOT 0 5 11/10/2017       Chest X-ray CXR 12/21/17 - no focal infiltrates, clear CP angles, no PTX, normal cardiomediastinal silhouette  CT Scan CT scan 2008 -  mild pectus deformity, no parenchymal infiltrates or ILD changes  Cardiac Testing TTE 11/30/17 - EF 65%, normal RV, trace MR, trace AR        PFT Interpretation:   11/28/17 - Spirometry - Ratio 78%, FVC 5 68L (94%), FEV1 4 41L (89%) - post BD FEV1 inc 8% - normal spirometry, not clearly improved with BD    01/09/18 = Ratio 82%, FVC 5 80L (99%), FEV1 4 74L (99%) - normal spirometry  01/09/18 - 6MWT on room air - total walk distance 558 meters, initial SpO2 98%, erickson SpO2 97%, conclusion SpO2 98%, initial HR 87bpm, maximal HR 116bpm      Jerzy Boland DO, [facility]

## 2018-04-02 NOTE — ASSESSMENT & PLAN NOTE
· Clinically improved  · Noted likely allergen triggers  · Attempt to wean Symbicort to 1puff bid in next 2-3 months  · Continue as needed ProAir  · He will follow up on an as needed basis and continues to have care thru [facility] - Dr Natasha Suazo

## 2018-12-10 NOTE — PATIENT INSTRUCTIONS
· Continue symbicort 2puffs twice a day - if your cough and shortness of breath remains resolved - can reduce to 1puff twice daily until seen next by your allergist  · Continue to use ProAir inhaler 1-2 puffs prior to exercise and every 4 hours as needed for shortness of breath    · Wean down your omeprazole to 1 pill every other day for 2 weeks, then one pill twice a week x 2 weeks then stop    Call if your cough returns or worsens with stopping the omeprazole complains of pain/discomfort

## 2019-10-28 ENCOUNTER — TELEPHONE (OUTPATIENT)
Dept: FAMILY MEDICINE CLINIC | Facility: CLINIC | Age: 29
End: 2019-10-28

## 2023-10-03 ENCOUNTER — OFFICE VISIT (OUTPATIENT)
Dept: FAMILY MEDICINE CLINIC | Facility: CLINIC | Age: 33
End: 2023-10-03
Payer: COMMERCIAL

## 2023-10-03 VITALS
BODY MASS INDEX: 35.94 KG/M2 | OXYGEN SATURATION: 96 % | HEIGHT: 74 IN | WEIGHT: 280 LBS | DIASTOLIC BLOOD PRESSURE: 80 MMHG | TEMPERATURE: 96.7 F | HEART RATE: 74 BPM | SYSTOLIC BLOOD PRESSURE: 110 MMHG

## 2023-10-03 DIAGNOSIS — R53.82 CHRONIC FATIGUE: ICD-10-CM

## 2023-10-03 DIAGNOSIS — Z13.228 SCREENING FOR ENDOCRINE, METABOLIC AND IMMUNITY DISORDER: ICD-10-CM

## 2023-10-03 DIAGNOSIS — Z13.29 SCREENING FOR ENDOCRINE, METABOLIC AND IMMUNITY DISORDER: ICD-10-CM

## 2023-10-03 DIAGNOSIS — R68.82 DECREASED LIBIDO: ICD-10-CM

## 2023-10-03 DIAGNOSIS — R79.89 ELEVATED LIVER FUNCTION TESTS: ICD-10-CM

## 2023-10-03 DIAGNOSIS — Z13.1 SCREENING FOR DIABETES MELLITUS (DM): ICD-10-CM

## 2023-10-03 DIAGNOSIS — Z00.00 WELL ADULT EXAM: Primary | ICD-10-CM

## 2023-10-03 DIAGNOSIS — Z23 NEED FOR TDAP VACCINATION: ICD-10-CM

## 2023-10-03 DIAGNOSIS — Z30.09 VASECTOMY EVALUATION: ICD-10-CM

## 2023-10-03 DIAGNOSIS — Z13.0 SCREENING FOR DEFICIENCY ANEMIA: ICD-10-CM

## 2023-10-03 DIAGNOSIS — E78.49 OTHER HYPERLIPIDEMIA: ICD-10-CM

## 2023-10-03 DIAGNOSIS — Z13.220 SCREENING FOR LIPID DISORDERS: ICD-10-CM

## 2023-10-03 DIAGNOSIS — Z13.0 SCREENING FOR ENDOCRINE, METABOLIC AND IMMUNITY DISORDER: ICD-10-CM

## 2023-10-03 DIAGNOSIS — R73.01 ELEVATED FASTING BLOOD SUGAR: ICD-10-CM

## 2023-10-03 PROBLEM — E78.5 HYPERLIPIDEMIA, UNSPECIFIED: Status: ACTIVE | Noted: 2017-03-22

## 2023-10-03 PROBLEM — J45.40 MODERATE PERSISTENT ALLERGIC ASTHMA: Status: RESOLVED | Noted: 2018-04-02 | Resolved: 2023-10-03

## 2023-10-03 PROBLEM — R05.3 CHRONIC COUGHING: Status: RESOLVED | Noted: 2018-01-09 | Resolved: 2023-10-03

## 2023-10-03 LAB
ALBUMIN SERPL-MCNC: 4.6 G/DL (ref 3.6–5.1)
ALBUMIN/GLOB SERPL: 1.8 (CALC) (ref 1–2.5)
ALP SERPL-CCNC: 80 U/L (ref 36–130)
ALT SERPL-CCNC: 35 U/L (ref 9–46)
AST SERPL-CCNC: 19 U/L (ref 10–40)
BASOPHILS # BLD AUTO: 62 CELLS/UL (ref 0–200)
BASOPHILS NFR BLD AUTO: 0.9 %
BILIRUB SERPL-MCNC: 0.8 MG/DL (ref 0.2–1.2)
BUN SERPL-MCNC: 15 MG/DL (ref 7–25)
BUN/CREAT SERPL: ABNORMAL (CALC) (ref 6–22)
CALCIUM SERPL-MCNC: 9.9 MG/DL (ref 8.6–10.3)
CHLORIDE SERPL-SCNC: 103 MMOL/L (ref 98–110)
CHOLEST SERPL-MCNC: 227 MG/DL
CHOLEST/HDLC SERPL: 7.1 (CALC)
CO2 SERPL-SCNC: 30 MMOL/L (ref 20–32)
CREAT SERPL-MCNC: 1.06 MG/DL (ref 0.6–1.26)
EOSINOPHIL # BLD AUTO: 235 CELLS/UL (ref 15–500)
EOSINOPHIL NFR BLD AUTO: 3.4 %
ERYTHROCYTE [DISTWIDTH] IN BLOOD BY AUTOMATED COUNT: 13.4 % (ref 11–15)
GFR/BSA.PRED SERPLBLD CYS-BASED-ARV: 95 ML/MIN/1.73M2
GLOBULIN SER CALC-MCNC: 2.5 G/DL (CALC) (ref 1.9–3.7)
GLUCOSE SERPL-MCNC: 100 MG/DL (ref 65–99)
HCT VFR BLD AUTO: 45.7 % (ref 38.5–50)
HDLC SERPL-MCNC: 32 MG/DL
HGB BLD-MCNC: 14.7 G/DL (ref 13.2–17.1)
LDLC SERPL CALC-MCNC: 154 MG/DL (CALC)
LYMPHOCYTES # BLD AUTO: 2167 CELLS/UL (ref 850–3900)
LYMPHOCYTES NFR BLD AUTO: 31.4 %
MCH RBC QN AUTO: 26.6 PG (ref 27–33)
MCHC RBC AUTO-ENTMCNC: 32.2 G/DL (ref 32–36)
MCV RBC AUTO: 82.6 FL (ref 80–100)
MONOCYTES # BLD AUTO: 552 CELLS/UL (ref 200–950)
MONOCYTES NFR BLD AUTO: 8 %
NEUTROPHILS # BLD AUTO: 3885 CELLS/UL (ref 1500–7800)
NEUTROPHILS NFR BLD AUTO: 56.3 %
NONHDLC SERPL-MCNC: 195 MG/DL (CALC)
PLATELET # BLD AUTO: 300 THOUSAND/UL (ref 140–400)
PMV BLD REES-ECKER: 10.6 FL (ref 7.5–12.5)
POTASSIUM SERPL-SCNC: 4.4 MMOL/L (ref 3.5–5.3)
PROT SERPL-MCNC: 7.1 G/DL (ref 6.1–8.1)
RBC # BLD AUTO: 5.53 MILLION/UL (ref 4.2–5.8)
SODIUM SERPL-SCNC: 140 MMOL/L (ref 135–146)
TESTOST SERPL-MCNC: 472 NG/DL (ref 250–827)
TRIGL SERPL-MCNC: 251 MG/DL
TSH SERPL-ACNC: 1.57 MIU/L (ref 0.4–4.5)
WBC # BLD AUTO: 6.9 THOUSAND/UL (ref 3.8–10.8)

## 2023-10-03 PROCEDURE — 90471 IMMUNIZATION ADMIN: CPT

## 2023-10-03 PROCEDURE — 99395 PREV VISIT EST AGE 18-39: CPT | Performed by: FAMILY MEDICINE

## 2023-10-03 PROCEDURE — 90715 TDAP VACCINE 7 YRS/> IM: CPT

## 2023-10-03 NOTE — PROGRESS NOTES
Subjective     Elvia Holloway is a 35 y.o.  male and is here for routine health maintenance. The patient reports would like blood work with testosterone. Pt would like to get a vasectomy    History of Present Illness     Pt is here for a physical today. Had covid at [facility] and 1 day of symptoms, very mild  Has not needed inhalers at all even with activity. Lost 17 pounds = was 294 pounds since April   Fasting in am, less portion size. No chest pain or shortness of breath. bms normal  Needs dental visit. Would like testosterone checked, fatigue over the past 5 years, decreased sex drive- getting worse   Does not want children, looking into getting vasectomy         Well Adult Physical   Patient here for a comprehensive physical exam.      Diet and Physical Activity  Diet: well balanced diet  Weight concerns: Patient has class 2 obesity (BMI 35.0-39. 9)  Exercise: active job      Depression Screen  PHQ-2/9 Depression Screening    Little interest or pleasure in doing things: 0 - not at all  Feeling down, depressed, or hopeless: 0 - not at all  PHQ-2 Score: 0  PHQ-2 Interpretation: Negative depression screen          General Health  Hearing: Normal:  bilateral  Vision: no vision problems  Dental: no regular dental visits      Cancer Screening  Colononoscopy not indicated  PSA not indicated    Smoker NO   Annual screening with low-dose helical computed tomography (CT) for patients age 54 to 76 years with history of smoking at least 30 pack-years and, if a former smoker, had quit within the previous 15 years      The following portions of the patient's history were reviewed and updated as appropriate: allergies, current medications, past family history, past medical history, past social history, past surgical history and problem list.    Review of Systems     Review of Systems   Constitutional: Positive for fatigue. Negative for fever. HENT: Negative. Eyes: Negative. Respiratory: Negative.   Negative for cough. Cardiovascular: Negative. Gastrointestinal: Negative. Endocrine: Negative. Genitourinary: Negative. Musculoskeletal: Negative. Skin: Negative. Allergic/Immunologic: Negative. Neurological: Negative. Psychiatric/Behavioral: Negative. Past Medical History     History reviewed. No pertinent past medical history. Past Surgical History     Past Surgical History:   Procedure Laterality Date   • WISDOM TOOTH EXTRACTION         Social History     Social History     Socioeconomic History   • Marital status: Single     Spouse name: None   • Number of children: None   • Years of education: None   • Highest education level: None   Occupational History   • None   Tobacco Use   • Smoking status: Never   • Smokeless tobacco: Never   Substance and Sexual Activity   • Alcohol use: Yes     Comment: Social drinker   • Drug use: No   • Sexual activity: None   Other Topics Concern   • None   Social History Narrative    Exercising regularly      Social Determinants of Health     Financial Resource Strain: Not on file   Food Insecurity: Not on file   Transportation Needs: Not on file   Physical Activity: Not on file   Stress: Not on file   Social Connections: Not on file   Intimate Partner Violence: Not on file   Housing Stability: Not on file       Family History     Family History   Problem Relation Age of Onset   • Asthma Mother    • Arthritis Maternal Grandmother    • Asthma Maternal Grandmother    • Diabetes Maternal Grandmother    • Cancer Family    • Valvular heart disease Family        Current Medications       Current Outpatient Medications:   •  Multiple Vitamin (MULTI-VITAMIN DAILY) TABS, Take by mouth, Disp: , Rfl:      Allergies     No Known Allergies    Objective     /80   Pulse 74   Temp (!) 96.7 °F (35.9 °C) (Tympanic)   Ht 6' 2" (1.88 m)   Wt 127 kg (280 lb)   SpO2 96%   BMI 35.95 kg/m²      Physical Exam  Vitals and nursing note reviewed.    Constitutional: Appearance: He is well-developed. HENT:      Head: Normocephalic. Right Ear: External ear normal.      Left Ear: External ear normal.      Nose: Nose normal.   Eyes:      Conjunctiva/sclera: Conjunctivae normal.      Pupils: Pupils are equal, round, and reactive to light. Cardiovascular:      Rate and Rhythm: Normal rate and regular rhythm. Heart sounds: Normal heart sounds. Pulmonary:      Effort: Pulmonary effort is normal.      Breath sounds: Normal breath sounds. Abdominal:      General: Bowel sounds are normal.      Palpations: Abdomen is soft. Musculoskeletal:      Cervical back: Normal range of motion and neck supple. Skin:     General: Skin is warm and dry. Neurological:      Mental Status: He is alert and oriented to person, place, and time. Psychiatric:         Behavior: Behavior normal.         Thought Content: Thought content normal.         Judgment: Judgment normal.           No results found.     Health Maintenance     Health Maintenance   Topic Date Due   • COVID-19 Vaccine (1) Never done   • Pneumococcal Vaccine: Pediatrics (0 to 5 Years) and At-Risk Patients (6 to 59 Years) (1 - PCV) Never done   • Influenza Vaccine (1) 06/30/2024 (Originally 9/1/2023)   • Depression Screening  10/03/2024   • BMI: Followup Plan  10/03/2024   • BMI: Adult  10/03/2024   • Annual Physical  10/03/2024   • DTaP,Tdap,and Td Vaccines (8 - Td or Tdap) 10/03/2033   • HIV Screening  Completed   • Hepatitis C Screening  Completed   • HIB Vaccine  Completed   • IPV Vaccine  Completed   • Hepatitis A Vaccine  Aged Out   • Meningococcal ACWY Vaccine  Aged Out   • HPV Vaccine  Aged Out     Immunization History   Administered Date(s) Administered   • DTaP 5 1990, 02/19/1991, 04/16/1991, 01/21/1992, 03/05/1996   • Hep B, adult 04/06/1999, 05/18/1999, 03/07/2000   • Hib (PRP-OMP) 01/10/1991, 04/06/1991, 01/21/1992, 04/21/1992   • MMR 09/21/1993, 03/05/1994   • Meningococcal, Unknown Serogroups 03/26/2007   • OPV 1990, 02/19/1991, 04/21/1992, 03/05/1996   • Tdap 01/21/1992, 08/14/2013, 10/03/2023   • Varicella 06/15/2001       Assessment/Plan       1. Healthy male exam.  2. Patient Counseling:   · Nutrition: Stressed importance of a well balanced diet, moderation of sodium/saturated fat, caloric balance and sufficient intake of fiber  · Exercise: Stressed the importance of regular exercise with a goal of 150 minutes per week  · Dental Health: Discussed daily flossing and brushing and regular dental visits     · Immunizations reviewed. · Discussed benefits of screening . · Discussed the patient's BMI with him. The BMI is above average; BMI management plan is completed  3. Cancer Screening   4. Labs   5. Tdap given today. Check on HPV vaccination. Fasting blood work . Schedule dental visit  6. Follow up in one year.     Celene Gowers, DO

## 2023-10-03 NOTE — PATIENT INSTRUCTIONS
Call insurance and ask if hpv is covered and in pcp office or at pharmacy. Tdap given today  urology  Wellness Visit for Adults   AMBULATORY CARE:   A wellness visit  is when you see your healthcare provider to get screened for health problems. Your healthcare provider will also give you advice on how to stay healthy. Write down your questions so you remember to ask them. Ask your healthcare provider how often you should have a wellness visit. What happens at a wellness visit:  Your healthcare provider will ask about your health, and your family history of health problems. This includes high blood pressure, heart disease, and cancer. He or she will ask if you have symptoms that concern you, if you smoke, and about your mood. You may also be asked about your intake of medicines, supplements, food, and alcohol. Any of the following may be done: Your weight  will be checked. Your height may also be checked so your body mass index (BMI) can be calculated. Your BMI shows if you are at a healthy weight. Your blood pressure  and heart rate will be checked. Your temperature may also be checked. Blood and urine tests  may be done. Blood tests may be done to check your cholesterol levels. Abnormal cholesterol levels increase your risk for heart disease and stroke. You may also need a blood or urine test to check for diabetes if you are at increased risk. Urine tests may be done to look for signs of an infection or kidney disease. A physical exam  includes checking your heartbeat and lungs with a stethoscope. Your healthcare provider may also check your skin to look for sun damage. Screening tests  may be recommended. A screening test is done to check for diseases that may not cause symptoms. The screening tests you may need depend on your age, gender, family history, and lifestyle habits. For example, colorectal screening may be recommended if you are 48years old or older.     Screening tests you need if you are a woman:   A Pap smear  is used to screen for cervical cancer. Pap smears are usually done every 3 to 5 years depending on your age. You may need them more often if you have had abnormal Pap smear test results in the past. Ask your healthcare provider how often you should have a Pap smear. A mammogram  is an x-ray of your breasts to screen for breast cancer. Experts recommend mammograms every 2 years starting at age 48 years. You may need a mammogram at age 52 years or younger if you have an increased risk for breast cancer. Talk to your healthcare provider about when you should start having mammograms and how often you need them. Vaccines you may need:   Get an influenza vaccine  every year. The influenza vaccine protects you from the flu. Several types of viruses cause the flu. The viruses change over time, so new vaccines are made each year. Get a tetanus-diphtheria (Td) booster vaccine  every 10 years. This vaccine protects you against tetanus and diphtheria. Tetanus is a severe infection that may cause painful muscle spasms and lockjaw. Diphtheria is a severe bacterial infection that causes a thick covering in the back of your mouth and throat. Get a human papillomavirus (HPV) vaccine  if you are female and aged 23 to 32 or male 23 to 24 and never received it. This vaccine protects you from HPV infection. HPV is the most common infection spread by sexual contact. HPV may also cause vaginal, penile, and anal cancers. Get a pneumococcal vaccine  if you are aged 72 years or older. The pneumococcal vaccine is an injection given to protect you from pneumococcal disease. Pneumococcal disease is an infection caused by pneumococcal bacteria. The infection may cause pneumonia, meningitis, or an ear infection. Get a shingles vaccine  if you are 60 or older, even if you have had shingles before. The shingles vaccine is an injection to protect you from the varicella-zoster virus.  This is the same virus that causes chickenpox. Shingles is a painful rash that develops in people who had chickenpox or have been exposed to the virus. How to eat healthy:  My Plate is a model for planning healthy meals. It shows the types and amounts of foods that should go on your plate. Fruits and vegetables make up about half of your plate, and grains and protein make up the other half. A serving of dairy is included on the side of your plate. The amount of calories and serving sizes you need depends on your age, gender, weight, and height. Examples of healthy foods are listed below:  Eat a variety of vegetables  such as dark green, red, and orange vegetables. You can also include canned vegetables low in sodium (salt) and frozen vegetables without added butter or sauces. Eat a variety of fresh fruits , canned fruit in 100% juice, frozen fruit, and dried fruit. Include whole grains. At least half of the grains you eat should be whole grains. Examples include whole-wheat bread, wheat pasta, brown rice, and whole-grain cereals such as oatmeal.    Eat a variety of protein foods such as seafood (fish and shellfish), lean meat, and poultry without skin (turkey and chicken). Examples of lean meats include pork leg, shoulder, or tenderloin, and beef round, sirloin, tenderloin, and extra lean ground beef. Other protein foods include eggs and egg substitutes, beans, peas, soy products, nuts, and seeds. Choose low-fat dairy products such as skim or 1% milk or low-fat yogurt, cheese, and cottage cheese. Limit unhealthy fats  such as butter, hard margarine, and shortening. Exercise:  Exercise at least 30 minutes per day on most days of the week. Some examples of exercise include walking, biking, dancing, and swimming. You can also fit in more physical activity by taking the stairs instead of the elevator or parking farther away from stores. Include muscle strengthening activities 2 days each week.  Regular exercise provides many health benefits. It helps you manage your weight, and decreases your risk for type 2 diabetes, heart disease, stroke, and high blood pressure. Exercise can also help improve your mood. Ask your healthcare provider about the best exercise plan for you. General health and safety guidelines:   Do not smoke. Nicotine and other chemicals in cigarettes and cigars can cause lung damage. Ask your healthcare provider for information if you currently smoke and need help to quit. E-cigarettes or smokeless tobacco still contain nicotine. Talk to your healthcare provider before you use these products. Limit alcohol. A drink of alcohol is 12 ounces of beer, 5 ounces of wine, or 1½ ounces of liquor. Lose weight, if needed. Being overweight increases your risk of certain health conditions. These include heart disease, high blood pressure, type 2 diabetes, and certain types of cancer. Protect your skin. Do not sunbathe or use tanning beds. Use sunscreen with a SPF 15 or higher. Apply sunscreen at least 15 minutes before you go outside. Reapply sunscreen every 2 hours. Wear protective clothing, hats, and sunglasses when you are outside. Drive safely. Always wear your seatbelt. Make sure everyone in your car wears a seatbelt. A seatbelt can save your life if you are in an accident. Do not use your cell phone when you are driving. This could distract you and cause an accident. Pull over if you need to make a call or send a text message. Practice safe sex. Use latex condoms if are sexually active and have more than one partner. Your healthcare provider may recommend screening tests for sexually transmitted infections (STIs). Wear helmets, lifejackets, and protective gear. Always wear a helmet when you ride a bike or motorcycle, go skiing, or play sports that could cause a head injury. Wear protective equipment when you play sports.  Wear a lifejacket when you are on a boat or doing water sports. © Copyright [facility] [employer] 2023 Information is for End User's use only and may not be sold, redistributed or otherwise used for commercial purposes. The above information is an  only. It is not intended as medical advice for individual conditions or treatments. Talk to your doctor, nurse or pharmacist before following any medical regimen to see if it is safe and effective for you.

## 2023-10-04 ENCOUNTER — TELEPHONE (OUTPATIENT)
Dept: FAMILY MEDICINE CLINIC | Facility: CLINIC | Age: 33
End: 2023-10-04

## 2023-10-04 DIAGNOSIS — E78.49 OTHER HYPERLIPIDEMIA: Primary | ICD-10-CM

## 2023-10-04 DIAGNOSIS — E78.00 HIGH CHOLESTEROL: ICD-10-CM

## 2023-10-04 NOTE — TELEPHONE ENCOUNTER
----- Message from Alivia Joya DO sent at 10/4/2023 12:25 PM EDT -----  No mychart  Your labs are good except cholesterol level is too high. Ok to send [address] diet sheet and recheck in 6 months.   Testosterone level is normal

## 2023-10-04 NOTE — TELEPHONE ENCOUNTER
Called Pt. Spoke to Pt, and Pt advised on results. Your labs are good except cholesterol level is too high. Sent mediterranean diet sheet and confirmed patient's address and advised recheck in 6 months.  Testosterone level is normal.

## 2023-12-02 PROBLEM — Z00.00 WELL ADULT EXAM: Status: RESOLVED | Noted: 2023-10-03 | Resolved: 2023-12-02

## 2024-09-25 ENCOUNTER — OFFICE VISIT (OUTPATIENT)
Dept: FAMILY MEDICINE CLINIC | Facility: CLINIC | Age: 34
End: 2024-09-25
Payer: COMMERCIAL

## 2024-09-25 VITALS
HEIGHT: 74 IN | WEIGHT: 292 LBS | OXYGEN SATURATION: 98 % | DIASTOLIC BLOOD PRESSURE: 74 MMHG | TEMPERATURE: 97.2 F | HEART RATE: 68 BPM | SYSTOLIC BLOOD PRESSURE: 118 MMHG | BODY MASS INDEX: 37.47 KG/M2

## 2024-09-25 DIAGNOSIS — M67.432 GANGLION CYST OF WRIST, LEFT: Primary | ICD-10-CM

## 2024-09-25 DIAGNOSIS — G56.03 BILATERAL CARPAL TUNNEL SYNDROME: ICD-10-CM

## 2024-09-25 PROCEDURE — 99213 OFFICE O/P EST LOW 20 MIN: CPT | Performed by: FAMILY MEDICINE

## 2024-09-25 NOTE — PATIENT INSTRUCTIONS
Patient Education     Carpal Tunnel Exercises   About this topic   Carpal tunnel syndrome is a very common health problem. It is most often caused by doing hand or wrist movements over and over. It can also be caused by using the lower arm muscles too much.  The carpal tunnel is the small area in your wrist that your median nerve runs through. A tough band of tissues called a ligament holds everything in place over the carpal tunnel. Your median nerve runs from your neck through your lower arm into your hand. If this nerve is squeezed at the wrist area, you may feel pain and have other signs. Your hand, fingers, and wrist may feel weak, numb, or tingly. This is called carpal tunnel syndrome.  Your doctor may want you to try exercises to help your signs. Other times, you will do these exercises after surgery.  General   Before starting with a program, ask your doctor if you are healthy enough to do these exercises. Your doctor may have you work with a  or physical therapist to make a safe exercise program to meet your needs.  Stretching Exercises   Stretching exercises keep your muscles flexible. They also stop them from getting tight. Start by doing each of these stretches 2 to 3 times. In order for your body to make changes, you will need to hold these stretches for 20 to 30 seconds. Repeat each exercise 2 to 3 times each day. Do all exercises slowly.  Wrist stretches bending back ? Straighten your elbow and have your palm facing up. Keeping your elbow straight, bend your wrist back so that your fingers are now pointing to the floor. Grab your hand with your other hand and push back the wrist until you feel a stretch. If you just had surgery, you should not do this exercise until your therapist or doctor tells you it is OK.  Strengthening Exercises   Strengthening exercises keep your muscles firm and strong. Sit while doing these exercises. Be sure to use good posture. Start by repeating each exercise 2 to 3  times. Work up to doing each exercise 10 times. Try to do the exercises 2 to 3 times each day. Do all exercises slowly.  Tendon gliding exercises using 4 positions ? Start by holding your hand with your fingers straight. Then, bend only the last two joints of your fingers and move your fingers into a hook or claw position. Next, straighten your fingers and bend your knuckles to make a flat table top position. This is also called the duckbill position. Last, make a full fist. Moving your hand into all 4 positions is one exercise.  Wrist exercises:  Side-to-side ? Hold one arm still using your other hand. Move your hand from side to side.  Up and down ? Hold one arm still using your other hand. Bend your wrist up and down.  Wrist circles ? Move each wrist in a [place] in one direction. Now, move each wrist in a [place] in the other direction.           What will the results be?   Less pain, pressure, stiffness, and swelling in your wrist and hand  Ease numbness and tingling in your hand and fingers  Increased blood flow to the nerves, muscles, and joints of your wrist and hand to help healing  Increased hand and  strength  Keep your muscles and joints strong and flexible  Helpful tips   Stay active and work out to keep your muscles strong and flexible.  Be sure you do not hold your breath when exercising. This can raise your blood pressure. If you tend to hold your breath, try counting out loud when exercising. If any exercise bothers you, stop right away.  Always warm up before stretching. Heated muscles stretch much easier than cool muscles. Stretching cool muscles can lead to injury.  Try walking and swinging your arms at an easy pace for a few minutes to warm up your muscles. Do this again after exercising.  Never bounce when doing stretches.  Doing exercises before a meal may be a good way to get into a routine.  Exercise may be slightly uncomfortable, but you should not have sharp pains. If you do get sharp  pains, stop what you are doing. If the sharp pains continue, call your doctor.  Last Reviewed Date   2021-05-10  Consumer Information Use and Disclaimer   This generalized information is a limited summary of diagnosis, treatment, and/or medication information. It is not meant to be comprehensive and should be used as a tool to help the user understand and/or assess potential diagnostic and treatment options. It does NOT include all information about conditions, treatments, medications, side effects, or risks that may apply to a specific patient. It is not intended to be medical advice or a substitute for the medical advice, diagnosis, or treatment of a health care provider based on the health care provider's examination and assessment of a patient’s specific and unique circumstances. Patients must speak with a health care provider for complete information about their health, medical questions, and treatment options, including any risks or benefits regarding use of medications. This information does not endorse any treatments or medications as safe, effective, or approved for treating a specific patient. [employer] and its affiliates disclaim any warranty or liability relating to this information or the use thereof. The use of this information is governed by the Terms of Use, available at https://www.[employer].com/en/know/clinical-effectiveness-terms   Copyright   Copyright © 2024 Up[employer] Inc. and its affiliates and/or licensors. All rights reserved.  Patient Education     Ganglion cyst   The Basics   Written by the doctors and editors at [employer]   What is a ganglion cyst? -- A ganglion cyst is a small sac of fluid that forms over a joint or tendon.  Common places for a ganglion cyst are:   Top of the wrist (picture 1)   Finger joints   Top of the foot  Ganglion cysts can also form on the knee, shoulder, back, or other parts of the body.  What are the symptoms of a ganglion cyst? -- The symptoms include:    Swelling   Pain   Trouble moving a joint  Is there a test for ganglion cyst? -- Yes. If you have a bump that looks like a ganglion cyst, the doctor or nurse will probably be able to tell what it is just by doing an exam. They might also shine a powerful light into it. If light passes through, that means the bump is filled with fluid. This tells the doctor that it could be a ganglion cyst.  If the doctor or nurse is not sure what is causing your symptoms, they might order an imaging test such as an MRI or an ultrasound. Imaging tests create pictures of the inside of the body.  How is a ganglion cyst treated? -- Some ganglion cysts go away without any treatment. Your doctor or nurse might wait to see if the cyst goes away on its own.  If you do get treatment, the doctor or nurse might:   Drain the cyst - The doctor can stick a needle into a cyst and take out the fluid.   Do surgery - The doctor might take out the cyst and fix any damaged tissue nearby.  What if my symptoms do not get better? -- If your symptoms do not get better, talk with your doctor or nurse. If the ganglion cyst does not go away on its own, you might need treatment.  All topics are updated as new evidence becomes available and our peer review process is complete.  This topic retrieved from [employer] on: Feb 26, 2024.  Topic 52793 Version 10.0  Release: 32.2.4 - C32.56  © 2024 UpToDate, Inc. and/or its affiliates. All rights reserved.  picture 1: Ganglion cyst     The wrist is a common place for a ganglion cyst.  Graphic 385561 Version 3.0  Consumer Information Use and Disclaimer   Disclaimer: This generalized information is a limited summary of diagnosis, treatment, and/or medication information. It is not meant to be comprehensive and should be used as a tool to help the user understand and/or assess potential diagnostic and treatment options. It does NOT include all information about conditions, treatments, medications, side effects, or risks that  may apply to a specific patient. It is not intended to be medical advice or a substitute for the medical advice, diagnosis, or treatment of a health care provider based on the health care provider's examination and assessment of a patient's specific and unique circumstances. Patients must speak with a health care provider for complete information about their health, medical questions, and treatment options, including any risks or benefits regarding use of medications. This information does not endorse any treatments or medications as safe, effective, or approved for treating a specific patient. Up[employer] Inc[employer] and its affiliates disclaim any warranty or liability relating to this information or the use thereof.The use of this information is governed by the Terms of Use, available at https://www.[employer].com/en/know/clinical-effectiveness-terms. 2024© UpToDate, Inc. and its affiliates and/or licensors. All rights reserved.  Copyright   © 2024 Up[employer]Date, Inc. and/or its affiliates. All rights reserved.

## 2024-09-25 NOTE — PROGRESS NOTES
"Assessment/Plan:      1. Ganglion cyst of wrist, left  Assessment & Plan:  Evaluation by hand surgeon.   Orders:  -     Ambulatory Referral to Orthopedic Surgery; Future  2. Bilateral carpal tunnel syndrome  Assessment & Plan:  Mild- discussed wrist splints- evaluation with hand surgeon  Orders:  -     Ambulatory Referral to Orthopedic Surgery; Future        Subjective:  Chief Complaint   Patient presents with    Follow-up     lump on left wrist        Patient ID: Edy Martinez is a 34 y.o. male.    Pt here for a lump on his left wrist. Usually not painful. No redness. Denies trauma  Complains of intermittent tingling into hands.         Review of Systems   Constitutional: Negative.  Negative for fatigue and fever.   HENT: Negative.     Eyes: Negative.    Respiratory: Negative.  Negative for cough.    Cardiovascular: Negative.    Gastrointestinal: Negative.    Endocrine: Negative.    Genitourinary: Negative.    Musculoskeletal:  Positive for arthralgias.        Left wrist   Skin: Negative.    Allergic/Immunologic: Negative.    Neurological:  Positive for numbness.   Psychiatric/Behavioral: Negative.           The following portions of the patient's history were reviewed and updated as appropriate: allergies, current medications, past family history, past medical history, past social history, past surgical history and problem list.    Objective:  Vitals:    09/25/24 0800   BP: 118/74   Pulse: 68   Temp: (!) 97.2 °F (36.2 °C)   TempSrc: Tympanic   SpO2: 98%   Weight: 132 kg (292 lb)   Height: 6' 2\" (1.88 m)      Physical Exam  Vitals and nursing note reviewed.   Constitutional:       Appearance: He is well-developed.   HENT:      Head: Normocephalic and atraumatic.   Cardiovascular:      Rate and Rhythm: Normal rate and regular rhythm.      Heart sounds: Normal heart sounds.   Pulmonary:      Effort: Pulmonary effort is normal.      Breath sounds: Normal breath sounds.   Abdominal:      General: Bowel sounds are " normal.      Palpations: Abdomen is soft.   Musculoskeletal:         General: Deformity present. No tenderness or signs of injury.      Comments: Cyst left wrist 0.5cm  Moveable  Negative tinels and phalens   Skin:     General: Skin is warm and dry.   Neurological:      Mental Status: He is alert and oriented to person, place, and time.   Psychiatric:         Behavior: Behavior normal.         Thought Content: Thought content normal.         Judgment: Judgment normal.

## 2024-09-29 PROBLEM — G56.03 BILATERAL CARPAL TUNNEL SYNDROME: Status: ACTIVE | Noted: 2024-09-29

## 2024-09-29 PROBLEM — Z23 NEED FOR TDAP VACCINATION: Status: RESOLVED | Noted: 2023-10-03 | Resolved: 2024-09-29
